# Patient Record
Sex: MALE | Race: WHITE | NOT HISPANIC OR LATINO | Employment: FULL TIME | URBAN - METROPOLITAN AREA
[De-identification: names, ages, dates, MRNs, and addresses within clinical notes are randomized per-mention and may not be internally consistent; named-entity substitution may affect disease eponyms.]

---

## 2024-08-06 ENCOUNTER — OFFICE VISIT (OUTPATIENT)
Dept: PRIMARY CARE | Facility: CLINIC | Age: 74
End: 2024-08-06
Payer: MEDICARE

## 2024-08-06 VITALS
RESPIRATION RATE: 16 BRPM | BODY MASS INDEX: 22.13 KG/M2 | SYSTOLIC BLOOD PRESSURE: 101 MMHG | HEART RATE: 61 BPM | WEIGHT: 149.4 LBS | HEIGHT: 69 IN | DIASTOLIC BLOOD PRESSURE: 63 MMHG | TEMPERATURE: 97.2 F | OXYGEN SATURATION: 95 %

## 2024-08-06 DIAGNOSIS — R19.7 DIARRHEA, UNSPECIFIED TYPE: ICD-10-CM

## 2024-08-06 DIAGNOSIS — K52.9 ACUTE GASTROENTERITIS: Primary | ICD-10-CM

## 2024-08-06 PROCEDURE — 99203 OFFICE O/P NEW LOW 30 MIN: CPT | Performed by: NURSE PRACTITIONER

## 2024-08-06 RX ORDER — CETIRIZINE HYDROCHLORIDE 10 MG/1
10 TABLET ORAL DAILY
COMMUNITY

## 2024-08-06 RX ORDER — LEVOTHYROXINE SODIUM 137 UG/1
0.14 TABLET ORAL
COMMUNITY
Start: 2024-07-19

## 2024-08-06 RX ORDER — ATENOLOL 25 MG/1
25 TABLET ORAL DAILY
COMMUNITY

## 2024-08-06 RX ORDER — ROSUVASTATIN CALCIUM 40 MG/1
40 TABLET, COATED ORAL
COMMUNITY
Start: 2023-11-01

## 2024-08-06 RX ORDER — VIT C/E/ZN/COPPR/LUTEIN/ZEAXAN 250MG-90MG
25 CAPSULE ORAL
COMMUNITY

## 2024-08-06 RX ORDER — TAMSULOSIN HYDROCHLORIDE 0.4 MG/1
0.4 CAPSULE ORAL
COMMUNITY
Start: 2024-06-21

## 2024-08-06 RX ORDER — NAPROXEN SODIUM 220 MG/1
81 TABLET, FILM COATED ORAL
COMMUNITY
Start: 2023-11-02 | End: 2024-11-01

## 2024-08-06 RX ORDER — ACETAMINOPHEN 325 MG/1
650 TABLET ORAL
COMMUNITY
Start: 2024-04-12

## 2024-08-06 RX ORDER — LOPERAMIDE HCL 2 MG
2 TABLET ORAL 4 TIMES DAILY PRN
Qty: 20 TABLET | Refills: 0 | Status: SHIPPED | OUTPATIENT
Start: 2024-08-06 | End: 2024-08-11

## 2024-08-06 RX ORDER — NIACIN 500 MG
500 CAPSULE, EXTENDED RELEASE ORAL NIGHTLY
COMMUNITY

## 2024-08-06 RX ORDER — ROSUVASTATIN CALCIUM 20 MG/1
1 TABLET, COATED ORAL
COMMUNITY
Start: 2023-09-17

## 2024-08-06 RX ORDER — SERTRALINE HYDROCHLORIDE 100 MG/1
150 TABLET, FILM COATED ORAL
COMMUNITY
Start: 2024-06-14

## 2024-08-06 RX ORDER — CARVEDILOL 6.25 MG/1
6.25 TABLET ORAL
COMMUNITY
Start: 2023-11-01

## 2024-08-06 RX ORDER — SPIRONOLACTONE 25 MG/1
12.5 TABLET ORAL
COMMUNITY
Start: 2024-02-15 | End: 2024-08-13

## 2024-08-06 RX ORDER — FLUTICASONE PROPIONATE 50 MCG
SPRAY, SUSPENSION (ML) NASAL
COMMUNITY
Start: 2023-11-13

## 2024-08-06 RX ORDER — OMEPRAZOLE 20 MG/1
20 CAPSULE, DELAYED RELEASE ORAL
COMMUNITY
Start: 2023-11-09

## 2024-08-06 RX ORDER — MIRTAZAPINE 15 MG/1
15 TABLET, FILM COATED ORAL
COMMUNITY
Start: 2024-02-02

## 2024-08-06 ASSESSMENT — ENCOUNTER SYMPTOMS
LOSS OF SENSATION IN FEET: 0
DEPRESSION: 0
OCCASIONAL FEELINGS OF UNSTEADINESS: 0

## 2024-08-06 ASSESSMENT — PATIENT HEALTH QUESTIONNAIRE - PHQ9
1. LITTLE INTEREST OR PLEASURE IN DOING THINGS: NOT AT ALL
SUM OF ALL RESPONSES TO PHQ9 QUESTIONS 1 AND 2: 0
2. FEELING DOWN, DEPRESSED OR HOPELESS: NOT AT ALL
2. FEELING DOWN, DEPRESSED OR HOPELESS: NOT AT ALL
SUM OF ALL RESPONSES TO PHQ9 QUESTIONS 1 AND 2: 0
1. LITTLE INTEREST OR PLEASURE IN DOING THINGS: NOT AT ALL

## 2024-08-06 NOTE — PROGRESS NOTES
Subjective   Patient ID: Luis aHji is a 74 y.o. male who is with chief complaint of diarrhea.    HPI  Patient is a 74 y.o. male who CONSULTED AT [facility] today. Patient is with complaint of diarrhea. Patient states condition started about 8 days ago. he described the diarrhea as watery, 4-5 x a day for 8 days, brown, no blood, no black tarry material, non foul smelling. This was not accompanied by nausea, vomiting, abdominal pain, chills, nor fever. he denies any intake of water from pond, stream, nor intake of uncooked food. he denies fever, chills, yellow discoloration of skin / sclerae. he had no recent travel. He states that about a month ago, he had diarrhea for about a week but it resolved without any visit to healthcare provider. He also has loss of appetite.     Review of Systems  General: no weight loss, generally healthy, no fatigue  Head:  no headaches / sinus pain, no vertigo, no injury  Eyes: no diplopia, no tearing, no pain,   Ears: no change in hearing, no tinnitus, no bleeding, no vertigo  Mouth:  no dental difficulties, no gingival bleeding, no sore throat, no loss of sense of taste  Nose: no congestion, no  discharge, no bleeding, no obstruction, no loss of sense of smell  Neck: no stiffness, no pain, no tenderness, no masses, no bruit  Pulmonary: no dyspnea, no wheezing, no hemoptysis, no cough  Cardiovascular: no chest pain, no palpitations, no syncope, no orthopnea  Gastrointestinal: (+) decreased appetite, no dysphagia, no abdominal pains, (+) diarrhea, no emesis, no melena  Genito Urinary: no dysuria, no urinary urgency, no nocturia, no incontinence, no change in nature of urine  Musculoskeletal: no muscle ache, no joint pain, no limitation of range of motion, no paresthesia, no numbness  Constitutional: no fever, no chills, no night sweats    Objective   Physical Exam  General: ambulatory, in no acute distress  Head: normocephalic, no lesions  Eyes: pink palpebral  conjunctiva, anicteric sclerae, PERRLA, EOM's full  Abdomen: flat, hyperactive bowel sounds, soft, no direct tenderness, no rebound tenderness, no mass palpated, SIGNS: no [place], no Rovsings, no Psoas, no Obturator; No CVA tenderness,    Assessment/Plan   Problem List Items Addressed This Visit    None  Visit Diagnoses         Codes    Acute gastroenteritis    -  Primary K52.9    Relevant Medications    loperamide (Imodium A-D) 2 mg tablet    Other Relevant Orders    Occult Blood, Stool    Ova/Para + Giardia/Cryptosporidium Antigen    C. difficile, PCR    Stool Pathogen Panel, PCR    Diarrhea, unspecified type     R19.7    Relevant Medications    loperamide (Imodium A-D) 2 mg tablet    Other Relevant Orders    Occult Blood, Stool    Ova/Para + Giardia/Cryptosporidium Antigen    C. difficile, PCR    Stool Pathogen Panel, PCR        Patient to submit stool exam.  Stool to be examined for:   - ova and parasite   - culture   - clostridium difficile   - occult blood  Instructed patient with regards to freshness of specimen.  Will call patient with results.    DISCHARGE SUMMARY:   Probable diagnosis, differential diagnosis, treatment, treatment options, and probable complications were discussed and explained to patient. Patient to take medication/s associated with this visit. he was educated and advised on low fiber, BRAT diet. Advised to avoid high fiber foods. Advised to increase fluid intake (water and electrolyte drinks) as tolerated, if with no nausea, nor vomiting. Patient to return to clinic if there is worsening or persistence of symptoms. Patient educated on indications for stool examination. Patient verbalized understanding.    Patient to come back in 7 - 10 days if needed for worsening symptoms.           ANSHU Cardoso-CNP 08/06/24 3:19 PM

## 2024-08-06 NOTE — PROGRESS NOTES
Subjective   Patient ID: Luis Haji is a 74 y.o. male who presents for Diarrhea.      Symptoms: diarrhea, unwanted weight loss,   Length of symptoms: 1x week ago  OTC: anti diarrheals  with no help.  Related information:    HPI     Review of Systems    Objective   There were no vitals taken for this visit.    Physical Exam    Assessment/Plan

## 2024-08-06 NOTE — PATIENT INSTRUCTIONS
DISCHARGE SUMMARY:   Probable diagnosis, differential diagnosis, treatment, treatment options, and probable complications were discussed and explained to patient. Patient to take medication/s associated with this visit. he was educated and advised on low fiber, BRAT diet. Advised to avoid high fiber foods. Advised to increase fluid intake (water and electrolyte drinks) as tolerated, if with no nausea, nor vomiting. Patient to return to clinic if there is worsening or persistence of symptoms. Patient educated on indications for stool examination. Patient verbalized understanding.    Patient to come back in 7 - 10 days if needed for worsening symptoms.

## 2024-08-09 ENCOUNTER — APPOINTMENT (OUTPATIENT)
Dept: LAB | Facility: LAB | Age: 74
End: 2024-08-09
Payer: MEDICARE

## 2024-08-12 ENCOUNTER — LAB (OUTPATIENT)
Dept: LAB | Facility: LAB | Age: 74
End: 2024-08-12
Payer: MEDICARE

## 2024-08-12 DIAGNOSIS — R19.7 DIARRHEA, UNSPECIFIED TYPE: ICD-10-CM

## 2024-08-12 DIAGNOSIS — K52.9 ACUTE GASTROENTERITIS: ICD-10-CM

## 2024-08-12 PROCEDURE — 87506 IADNA-DNA/RNA PROBE TQ 6-11: CPT

## 2024-08-12 PROCEDURE — 87493 C DIFF AMPLIFIED PROBE: CPT

## 2024-08-12 PROCEDURE — 87329 GIARDIA AG IA: CPT

## 2024-08-12 PROCEDURE — 82270 OCCULT BLOOD FECES: CPT

## 2024-08-12 PROCEDURE — 87328 CRYPTOSPORIDIUM AG IA: CPT

## 2024-08-13 LAB
C COLI+JEJ+UPSA DNA STL QL NAA+PROBE: NOT DETECTED
EC STX1 GENE STL QL NAA+PROBE: NOT DETECTED
EC STX2 GENE STL QL NAA+PROBE: NOT DETECTED
HEMOCCULT SP1 STL QL: NEGATIVE
NOROVIRUS GI + GII RNA STL NAA+PROBE: NOT DETECTED
RV RNA STL NAA+PROBE: NOT DETECTED
SALMONELLA DNA STL QL NAA+PROBE: NOT DETECTED
SHIGELLA DNA SPEC QL NAA+PROBE: NOT DETECTED
V CHOLERAE DNA STL QL NAA+PROBE: NOT DETECTED
Y ENTEROCOL DNA STL QL NAA+PROBE: NOT DETECTED

## 2024-08-14 ENCOUNTER — DOCUMENTATION (OUTPATIENT)
Dept: PRIMARY CARE | Facility: CLINIC | Age: 74
End: 2024-08-14
Payer: MEDICARE

## 2024-08-15 LAB — C DIF TOX TCDA+TCDB STL QL NAA+PROBE: NOT DETECTED

## 2024-08-16 LAB
CRYPTOSP AG STL QL IA: NEGATIVE
G LAMBLIA AG STL QL IA: NEGATIVE

## 2024-08-17 LAB — O+P STL MICRO: NEGATIVE

## 2024-11-24 ENCOUNTER — OFFICE VISIT (OUTPATIENT)
Dept: URGENT CARE | Age: 74
End: 2024-11-24
Payer: MEDICARE

## 2024-11-24 VITALS
RESPIRATION RATE: 16 BRPM | DIASTOLIC BLOOD PRESSURE: 80 MMHG | HEART RATE: 89 BPM | TEMPERATURE: 97.7 F | OXYGEN SATURATION: 99 % | SYSTOLIC BLOOD PRESSURE: 125 MMHG

## 2024-11-24 DIAGNOSIS — R05.1 ACUTE COUGH: ICD-10-CM

## 2024-11-24 DIAGNOSIS — J06.9 VIRAL URI: Primary | ICD-10-CM

## 2024-11-24 PROCEDURE — 99203 OFFICE O/P NEW LOW 30 MIN: CPT | Performed by: FAMILY MEDICINE

## 2024-11-24 RX ORDER — BENZONATATE 200 MG/1
200 CAPSULE ORAL 3 TIMES DAILY PRN
Qty: 42 CAPSULE | Refills: 0 | Status: SHIPPED | OUTPATIENT
Start: 2024-11-24 | End: 2024-12-24

## 2024-11-24 NOTE — PATIENT INSTRUCTIONS
HOME CARE INSTRUCTIONS:   ---Over-the-counter cough and cold medications as needed (plain Mucinex)  --- gargle with lightly salted water several times a day  --- May take over-the-counter Tylenol for pain and fever control  --- Plenty of rest and sleep  --- Drink lots of fluids  --- Cover  your mouth and nose when coughing  or sneezing  --- Wash your hands often    SEEK FURTHER MEDICAL ATTENTION OR GO THE EMERGENCY ROOM IF:  --- Fever persists more than 3 days, or elevated over 104°  --- You have difficulty breathing or cannot catch your breath  --- Vomiting: unable to keep liquids, food or medications on stomach  --- Symptoms do not improve after 5-7  --- You Become listless, or get a stiff neck    Advised the patient to seek immediate Emergency Medical attention if symptoms fail to improve, worsen or any concerning symptoms arise.

## 2024-11-24 NOTE — PROGRESS NOTES
Subjective   Patient ID: Luis Haji is a 74 y.o. male. They present today with a chief complaint of Cough and Nasal Congestion (X1 week).    History of Present Illness  HPI  7 days of cough, congestion, postnasal drip runny nose. No fevers have been noted. Patient denies shortness of breath, chest pain, or wheezing. No red eyes, eye pain, or discharge. They deny nausea vomiting or diarrhea. No known exposures to strep mono influenza, COVID-19 or pneumonia. No skin rashes are noted. Over-the-counter medications are offering minimal relief from symptoms.      Past Medical History  Allergies as of 11/24/2024 - Reviewed 08/06/2024   Allergen Reaction Noted    Glycopyrrolate Other 05/23/2013    Hydrochlorothiazide Other 10/29/2007    Lisinopril Cough 07/05/2012       (Not in a hospital admission)       No past medical history on file.    No past surgical history on file.     reports that he has never smoked. He has never used smokeless tobacco.    Review of Systems  Review of Systems     As in history of present illness                          Objective    Vitals:    11/24/24 1343   BP: 125/80   BP Location: Right arm   Patient Position: Sitting   BP Cuff Size: Adult   Pulse: 89   Resp: 16   Temp: 36.5 °C (97.7 °F)   TempSrc: Oral   SpO2: 99%     No LMP for male patient.    Physical Exam  Gen.-alert cooperative and in no apparent distress  Head and face- no swelling redness, tenderness or rash  Eyes-EOMI, no redness or discharge is noted  ENT- bilateral nasal congestion with clear rhinorrhea and postnasal drip in oral pharynx  Neck- normal range of motion with no lymphadenopathy or mass.   Pulmonary- no respiratory distress noted. Lungs clear to auscultation without wheezes rhonchi or rales  Skin- no rashes discoloration or skin lesions noted  Lymphatic-- no lymph node swelling or tenderness appreciated  Procedures    Point of Care Test & Imaging Results from this visit  No results found for this visit on 11/24/24.    No results found.    Diagnostic study results (if any) were reviewed by Mark Gould MD.    Assessment/Plan   Allergies, medications, history, and pertinent labs/EKGs/Imaging reviewed by Mark Gould MD.     Medical Decision Making  At time of discharge patient was clinically well-appearing and HDS for outpatient management. The patient and/or family was educated regarding diagnosis, supportive care, OTC and Rx medications. The patient and/or family was given the opportunity to ask questions prior to discharge.  They verbalized understanding of my discussion of the plans for treatment, expected course, indications to return to  or seek further evaluation in ED, and the need for timely follow up as directed.   They were provided with a work/school excuse if requested.    Orders and Diagnoses  Diagnoses and all orders for this visit:  Viral URI  Acute cough  -     benzonatate (Tessalon) 200 mg capsule; Take 1 capsule (200 mg) by mouth 3 times a day as needed for cough. Do not crush or chew.      Medical Admin Record      Patient disposition: Home    Electronically signed by Mark Gould MD  1:56 PM